# Patient Record
Sex: MALE | URBAN - METROPOLITAN AREA
[De-identification: names, ages, dates, MRNs, and addresses within clinical notes are randomized per-mention and may not be internally consistent; named-entity substitution may affect disease eponyms.]

---

## 2021-03-29 ENCOUNTER — VIRTUAL VISIT (OUTPATIENT)
Dept: FAMILY MEDICINE CLINIC | Age: 41
End: 2021-03-29

## 2021-03-29 DIAGNOSIS — M77.01 MEDIAL EPICONDYLITIS OF RIGHT ELBOW: ICD-10-CM

## 2021-03-29 DIAGNOSIS — M54.50 CHRONIC RIGHT-SIDED LOW BACK PAIN WITHOUT SCIATICA: Primary | ICD-10-CM

## 2021-03-29 DIAGNOSIS — G89.29 CHRONIC RIGHT-SIDED LOW BACK PAIN WITHOUT SCIATICA: Primary | ICD-10-CM

## 2021-03-29 PROCEDURE — 99442 PR PHYS/QHP TELEPHONE EVALUATION 11-20 MIN: CPT | Performed by: FAMILY MEDICINE

## 2021-03-29 RX ORDER — CYCLOBENZAPRINE HCL 10 MG
10 TABLET ORAL
Qty: 30 TAB | Refills: 1 | Status: SHIPPED | OUTPATIENT
Start: 2021-03-29

## 2021-03-29 NOTE — PROGRESS NOTES
The AVS was printed and mailed to the pt's home address on file. Provider suggested texting [place] instructions which was done.    Sherri Hay RN

## 2021-03-29 NOTE — PATIENT INSTRUCTIONS
Use Voltaren Gel 1% para el dolor del codo. Aprenda sobre cómo tener krystyna espalda saludable Learning About How to Have a Healthy Back 

Qué causa el dolor de espalda? El dolor de espalda a menudo es causado por uso excesivo, distensión o lesión. Por ejemplo, las personas frecuentemente se lastiman la espalda al practicar deportes o trabajar en el denice, al ser sacudidas en un accidente automovilístico o al levantar algo demasiado pesado. El envejecimiento también desempeña un papel. Sheila Scot y los músculos tienden a perder fuerza a medida que envejece, lo cual aumenta las probabilidades de [place] Tabitha Fowlerrosita. Los discos [employer] huesos de la columna vertebral (vértebras) podrían tener desgaste y ya no proporcionar suficiente amortiguamiento entre los [place]. Un disco que sobresale o que se rompe (hernia de disco) puede presionar sobre los nervios, causando dolor de [place]. En [employer], el dolor de espalda es el resultado de la artritis, de vértebras rotas debido a la pérdida de [employer] (osteoporosis), de krystyna enfermedad o de un problema de la columna vertebral. 
Aunque la mayoría de las personas tienen dolor de espalda en un momento u otro, hay medidas que se pueden saqib para reducir la probabilidad de sufrirlo. Cómo puede tener krystyna espalda saludable? Reduzca la tensión en la espalda mediante krystyna buena [place] El desplomarse o encorvarse por sí solo tammi vez no cause dolor en la parte baja de la espalda. Willy krystyna vez que la espalda se ha distendido o lesionado, la nadeen postura puede empeorar el dolor. · Duerma en krystyna posición que mantenga las curvas naturales de la espalda y en un colchón cómodo. Duerma de lado con krystyna almohada entre las rodillas o boca arriba con krystyna almohada debajo de las rodillas. Estas posiciones pueden reducir la tensión en la espalda. · Manténgase erguido cuando esté de pie o sentado.  Tener krystyna \"buena postura\" por lo general significa que las Zhao, los hombros y las caderas están en línea recta. 
· Si debe permanecer de pie mucho tiempo, ponga un pie sobre un taburete, un bordillo o krystyna caja. Cambie de pie cada cierto tiempo. 
· Siéntese en krystyna silla que sea lo suficientemente baja laly para poner ambos pies en el suelo con las rodillas más o menos al nivel de la cadera. Si jose silla o jose escritorio son muy altos, utilice un reposapiés con el fin de elevar las rodillas. Colóquese krystyna almohada pequeña, krystyna toalla enrollada o un rollo lumbar en la curva de la espalda si necesita más apoyo. 
· Pruebe usar krystyna silla ergonómica con apoyo para las rodillas (\"kneeling chair\"), pues ayuda a inclinar las caderas hacia sadaf. Pajonal le reduce la presión en la parte baja de la espalda. 
· Intente sentarse sobre krystyna pelota de ejercicio. Puede balancearse de lado a lado, lo que ayuda a mantener la espalda relajada. 
· Al conducir, mantenga las rodillas anjeilca al nivel de las caderas. Siéntese derecho y conduzca con ambas chris sobre el volante. Los brazos deben estar levemente flexionados. 
Reduzca la tensión en la espalda levantando objetos con cuidado  
· Agáchese doblando solo la cadera y las rodillas. Si es necesario, ponga krystyna rodilla en el suelo y extienda la otra rodilla frente a usted en ángulo recto (genuflexión). 
· Empuje el pecho hacia adelante. Pajonal le ayuda a mantener la parte superior de la espalda derecha mientras mantiene un arco ligero en la parte baja. 
· Sostenga la carga tan cerca del cuerpo laly sea posible, a la altura del ombligo. 
· Utilice los pies para cambiar de dirección con pasos cortos. 
· Dirija con las caderas al cambiar de dirección. Mantenga los hombros en línea con las caderas mientras se desplaza. 
· Asiente la carga con cuidado, acuclillándose únicamente con las rodillas y las caderas. 
Ejercite y estire la espalda  
· Nahum algo de ejercicio la mayoría de los días de la semana, si jose médico lo aprueba. Puede caminar, correr, nadar o montar en  bicicleta. · Estire los músculos de la espalda. Los siguientes son algunos ejercicios que puede probar: ? Acuéstese de espalda y lleve suavemente krystyna rodilla flexionada hacia el pecho. Vuelva a poner marcel pie en el suelo y luego nahum lo mismo con la otra rodilla. ? Nahum inclinaciones de pelvis. Acuéstese de espalda con las rodillas flexionadas. Contraiga los músculos abdominales. Hunda el ombligo hacia adentro y [place], en dirección a las costillas. Deberá sentir laly si la espalda estuviera ejerciendo presión sobre el suelo y que las caderas y la pelvis se despegan levemente del suelo. Mantenga la posición elie 6 segundos mientras respira de Amy pausada. ? Siéntese con la espalda contra la pared. · Mantenga diana los músculos del tronco. Los músculos de la espalda, el abdomen y los glúteos sostienen la columna vertebral. 
? Hunda el abdomen e imagine que está llevando el ombligo hacia la columna. Mantenga la posición elie 6 segundos y luego relájese. Recuerde seguir respirando de manera normal [employer]. ? Meriam Sergio. Hágalos siempre con las rodillas dobladas. Mantenga la parte baja de la espalda sobre el suelo y [employer] hombros hacia las rodillas con un movimiento lento y uniforme. Mantenga los brazos cruzados sobre el pecho. Si esto le causa molestias en el shannan, pruebe a poner las chris detrás del shannan (no de la lalit), con los codos abiertos. ? Acuéstese boca arriba con las rodillas flexionadas y los pies apoyados sobre el suelo. Contraiga los músculos abdominales y luego empuje con los pies y eleve las nalgas algunas pulgadas. Mantenga la posición elie 6 segundos mientras continúa respirando de Amy normal y luego vuelva a bajar lentamente hacia el suelo. Repita de 8 a 12 veces. ? Si le gusta el ejercicio en sam, pruebe con Pilates o yoga.  Estas clases tienen posiciones que fortalecen los músculos del tronco. 
Lleve un estilo de abbey saludable · Mantenga un peso saludable para evitar sobrecargar la espalda. · No fume. Fumar aumenta el riesgo de osteoporosis, que debilita la columna vertebral. Si necesita ayuda para dejar de fumar, hable con jose médico sobre programas y medicamentos para dejar de fumar. Estos pueden aumentar carson probabilidades de dejar el hábito para siempre. Dónde puede encontrar más información en inglés? Tony Willson a http://www.gray.com/ Escriba L315 en la búsqueda para aprender más acerca de \"Aprenda sobre cómo tener krystyna espalda saludable. \" Revisado: 2 marzo, 2020               Versión del contenido: 12.6 © 5664-9794 Healthwise, Incorporated. Las instrucciones de cuidado fueron adaptadas bajo licencia por [employer] (which disclaims liability or warranty for this information). Si usted tiene [place] afección médica o sobre estas instrucciones, siempre pregunte a jose profesional de belen. Healthwise, Incorporated niega toda garantía o responsabilidad por jose uso de esta información. Dolor en la parte baja de la espalda (lumbalgia): Ejercicios Low Back Pain: Exercises Instrucciones de cuidado Aquí se presentan algunos ejemplos de ejercicios típicos de rehabilitación para tratar jose afección. Empiece cada ejercicio lentamente. Reduzca la intensidad del ejercicio si [place] a tener dolor. Jose médico o fisioterapeuta le dirán cuándo puede comenzar con estos ejercicios y cuáles funcionarán mejor para usted. Cómo hacer los ejercicios [employer] 1. Acuéstese boca abajo, apoyando jose cuerpo con los antebrazos. 2. Nahum presión con los codos en el piso para elevar la espalda. Al hacer esto, relaje los músculos del estómago y permita que ojse espalda se arquee sin usar los músculos de la espalda. Al hacer presión, evite que las caderas o la pelvis se separen del piso. 3. Mantenga la posición de 15 a 30 segundos y luego relájese. 4. Repita de 2 a 4 veces.    
Ejercicio de alternar Elizabeth  y pierna (gabbie de caza) 1. Comience con las chris y las rodillas en el piso. 2. Contraiga los músculos del abdomen. 3. Eleve krystyna pierna del suelo y manténgala recta detrás de usted. Tenga cuidado de no dejar caer la cadera hacia abajo, porque eso hará que se le tuerza el tronco. 
4. Mantenga la posición elie unos 6 segundos y luego baje la pierna y Tera a la otra pierna. 5. Repita de 8 a 12 veces con cada pierna. 6. Con el tiempo, vaya aumentando [employer] de 10 a 30 segundos cada vez. 
7. Si se siente estable y seguro con la pierna elevada, intente levantar el brazo opuesto directamente enfrente de usted al [employer]. Ejercicio de rodillas al [employer] 1. Acuéstese boca arriba con las rodillas flexionadas y los pies apoyados sobre el piso. 2. Lleve krystyna rodilla hacia el pecho, manteniendo el otro pie apoyado en el suelo (o dejando la otra pierna recta, laly lo sienta mejor en la parte baja de la espalda). 3. Mantenga la parte baja de la espalda presionada contra el suelo. Sosténgalo por lo menos de 15 a 30 segundos. 4. Relájese y regrese la rodilla a la posición inicial. 
5. Repita el ejercicio con la otra pierna. Repita de 2 a 4 veces con cada pierna. 6. Para lograr mayor estiramiento, deje la otra pierna apoyada en el suelo mientras se farhan la rodilla [place]. Abdominales 1. Acuéstese en el suelo boca arriba, con las rodillas dobladas en un ángulo de 90 grados. Los pies deben estar apoyados en el suelo, a unas 12 pulgadas (30 cm) de las nalgas (glúteos). 2. Cruce los [place]. Si esto le causa molestias en el shannan, pruebe a poner las chris detrás del shannan (no de la lalit), con los codos abiertos. 3. Contraiga lentamente los músculos del abdomen y eleve los omóplatos del suelo. 4. Mantenga la lalit alineada con el cuerpo y no presione la barbilla hacia el pecho.  
5. Sostenga esta posición por 1 o 2 segundos y [address] baje lentamente de [employer] suelo. 
6. Repita de 8 a 12 veces. Ejercicio de inclinación de pelvis 1. Acuéstese de espalda con las rodillas flexionadas. 2. \"Tense\" jose estómago. [place] significa apretar los músculos contrayendo el ombligo e imaginando que se mueve hacia la columna vertebral. Deberá sentir laly si la espalda estuviera ejerciendo presión sobre el suelo y que las caderas y la pelvis se balancean hacia atrás. 3. Mantenga la posición elie aproximadamente 6 segundos mientras respira suavemente. 4. Repita de 8 a 12 veces. Roy con Guerita Long 1. Acuéstese boca arriba con ambas rodillas flexionadas y los tobillos doblados de manera que solo los talones estén sobre el piso. Las rodillas deben estar dobladas más o menos a 90 grados. 2. A continuación mayra presión con los talones en el suelo, apriete las nalgas (glúteos) y levante las caderas del suelo hasta que los hombros, las caderas y las rodillas estén en línea recta. 3. Mantenga la posición elie unos 6 segundos mientras sigue respirando normalmente, y luego baje lentamente las caderas hacia el piso y descanse por hasta 10 segundos. [address] 8 a 12 repeticiones. Estiramiento de isquiotibiales en el johny de [facility] 1. Acuéstese boca arriba en el johny de [facility] con krystyna pierna a través de la Mark. 2. Deslice la pierna hacia arriba por la pared, para enderezar la rodilla. Debe sentir un leve estiramiento en la parte posterior de la pierna. 3. Sostenga el estiramiento por lo menos de 15 a 30 segundos. No arquee la espalda, estire los dedos de los pies ni flexione las rodillas. Mantenga un talón tocando el suelo y el otro tocando la pared. 4. Repita con la otra pierna. 5. Repita de 2 a 4 veces con cada pierna. Estiramiento de los músculos flexores de la cadera 1. Póngase de rodillas en el suelo con krystyna [place] y Marycruz Paula atrás. Coloque la rodilla de adelante encima del pie.  Mantenga la otra rodilla en contacto con el suelo. 
2. Empuje lentamente la cadera hacia adelante hasta que sienta un estiramiento en la parte superior del muslo de la pierna que está atrás. 3. Sostenga el estiramiento por lo menos de 15 a 30 segundos. Repita con la otra pierna. 4. Repita de 2 a 4 veces a cada lado. Sentadillas de pared 1. Párese con la espalda a entre 10 y 12 pulgadas (25 a 30 cm) de distancia de la pared. 2. Inclínese hacia la pared [employer] la espalda quede plana sobre hilton. 3. Deslícese lentamente hacia abajo hasta que las rodillas estén ligeramente flexionadas, presionando la parte baja de la espalda contra la pared. 4. Mantenga la posición elie unos 6 segundos y después deslícese hacia arriba por la pared. 5. Repita de 8 a 12 veces. La atención de seguimiento es krystyna parte clave de jose tratamiento y seguridad. Asegúrese de hacer y acudir a todas las citas, y llame a jose médico si está teniendo problemas. También es krystyna buena idea saber los resultados de carson exámenes y mantener krystyna lista de los medicamentos que laly. Dónde puede encontrar más información en inglés? Miguelpollo Lopez a [email or web address]/ Marga R122 en la búsqueda para aprender más acerca de \"Dolor en la parte baja de la espalda (lumbalgia): Ejercicios. \" Revisado: 2 marzo, 2020               Versión del contenido: 12.6 © 9295-8471 Healthwise, Incorporated. Las instrucciones de cuidado fueron adaptadas bajo licencia por [employer] (which disclaims liability or warranty for this information). Si usted tiene [place] afección médica o sobre estas instrucciones, siempre pregunte a jose profesional de belne. [facility] niega toda garantía o responsabilidad por jose uso de esta información.

## 2021-03-29 NOTE — PROGRESS NOTES
Howie Cushing (: 1980) is a 46 y.o. male, new patient, here for evaluation of the following chief complaint(s):   Arm Pain       ASSESSMENT/PLAN:  1. Chronic right-sided low back pain without sciatica  With documented DDD in past.  Will restart Flexeril, SE reviewed. Discussed back exercises and proper lifting. Patient handout will be sent. If not improved patient will call for F2F visit. 2. Medial epicondylitis of right elbow  Start Voltaren Gel OTC. No follow-ups on file. SUBJECTIVE/OBJECTIVE:  HPI  Arm Pain:  Rt elbow pain, can radiate to wrist, x 5 months. Worse with grasping and activity. Works construction. No numbness, paresthesias, weakness. Back Pain:  Rt side back pain, at the waist.  Pain off/on for many years. No LE numbness, paresthesias, weakness. Seen in ER 2014: xray L/S: mild DDD. Flexeril helped pain in past.    Review of Systems   Constitutional: Negative for fever. Gastrointestinal: Negative for abdominal pain. Neurological: Negative for weakness and numbness. No flowsheet data found. Physical Exam    On this date 2021 I have spent 15 minutes reviewing previous notes, test results and face to face (virtual) with the patient discussing the diagnosis and importance of compliance with the treatment plan as well as documenting on the day of the visit. Howie Cushing is being evaluated by a Virtual Visit (phone visit) encounter to address concerns as mentioned above. A caregiver was present when appropriate. Due to this being a TeleHealth encounter (During VYGEF-62 public health emergency), evaluation of the following organ systems was limited: Vitals/Constitutional/EENT/Resp/CV/GI//MS/Neuro/Skin/Heme-Lymph-Imm.   Pursuant to the emergency declaration under the [facility], 1135 waiver authority and the [employer] and [employer] Act, this Virtual Visit was conducted with patient's (and/or legal guardian's) consent, to reduce the patient's risk of exposure to COVID-19 and provide necessary medical care. The patient (and/or legal guardian) has also been advised to contact this office for worsening conditions or problems, and seek emergency medical treatment and/or call 911 if deemed necessary. Patient identification was verified at the start of the visit: YES    Services were provided through a phone synchronous discussion virtually to substitute for in-person clinic visit. Patient was located at home and provider was located in office or at home. An electronic signature was used to authenticate this note.   -- Alise Dobbs MD